# Patient Record
Sex: MALE | Race: OTHER | NOT HISPANIC OR LATINO | URBAN - METROPOLITAN AREA
[De-identification: names, ages, dates, MRNs, and addresses within clinical notes are randomized per-mention and may not be internally consistent; named-entity substitution may affect disease eponyms.]

---

## 2021-01-19 ENCOUNTER — EMERGENCY (EMERGENCY)
Facility: HOSPITAL | Age: 51
LOS: 1 days | Discharge: HOME | End: 2021-01-19
Attending: EMERGENCY MEDICINE | Admitting: EMERGENCY MEDICINE
Payer: COMMERCIAL

## 2021-01-19 VITALS
DIASTOLIC BLOOD PRESSURE: 90 MMHG | WEIGHT: 175.05 LBS | HEIGHT: 69 IN | TEMPERATURE: 99 F | HEART RATE: 98 BPM | SYSTOLIC BLOOD PRESSURE: 152 MMHG | OXYGEN SATURATION: 99 % | RESPIRATION RATE: 18 BRPM

## 2021-01-19 DIAGNOSIS — H57.89 OTHER SPECIFIED DISORDERS OF EYE AND ADNEXA: ICD-10-CM

## 2021-01-19 DIAGNOSIS — H57.13 OCULAR PAIN, BILATERAL: ICD-10-CM

## 2021-01-19 PROCEDURE — 99283 EMERGENCY DEPT VISIT LOW MDM: CPT

## 2021-01-19 NOTE — ED ADULT NURSE NOTE - OBJECTIVE STATEMENT
50 yo male c/o eight eye pain and intermittent blurry vision. Pt. reports that eyes have been itching x 2 months, and he went to [facility] ED where he was prescribed eye drops, and started taking OTC anti-itch eye drops, then two weeks ago he started having eye pain, right worse than left, which became "severe" 3 days ago. Denies change in visual acuity, c/o intermittent blurry vision upon waking up in the AM. Eyes clear on assessment, no drainage or redness noted. Denies photophobia, chest pain, fever/chills.

## 2021-01-19 NOTE — ED PROVIDER NOTE - NSFOLLOWUPINSTRUCTIONS_ED_ALL_ED_FT
Continue using your eye drops.  You can also use  ibuprofen if needed for pain.  Follow up at [facility] in 1-2 days.  Return to the Emergency Department if you have any new or worsening symptoms, or if you have any concerns.  =====================    Eye Pain    WHAT YOU NEED TO KNOW:    Eye pain may be caused by a problem within your eye. A problem or condition in another body area can also cause pain that travels to your eye. Some causes of eye pain include dry eyes, inflammation, a sinus infection, or a cluster headache.    DISCHARGE INSTRUCTIONS:    Medicines: You may need any of the following:  •Artificial tears are eyedrops that can help moisturize your eyes and relieve your pain. Ask your healthcare provider how often to use artificial tears.      •NSAIDs, such as ibuprofen, help decrease swelling, pain, and fever. This medicine is available with or without a doctor's order. NSAIDs can cause stomach bleeding or kidney problems in certain people. If you take blood thinner medicine, always ask if NSAIDs are safe for you. Always read the medicine label and follow directions. Do not give these medicines to children under 6 months of age without direction from your child's healthcare provider.      •Take your medicine as directed. Contact your healthcare provider if you think your medicine is not helping or if you have side effects. Tell him of her if you are allergic to any medicine. Keep a list of the medicines, vitamins, and herbs you take. Include the amounts, and when and why you take them. Bring the list or the pill bottles to follow-up visits. Carry your medicine list with you in case of an emergency.      Follow up with your healthcare provider as directed: You may be referred to an eye specialist. Write down your questions so you remember to ask them during your visits.    Contact your healthcare provider if:   •You have a fever.      •Your eye pain gets worse when you move your eyes.      •You have questions or concerns about your condition or care.      Return to the emergency department if:   •You have any vision loss.      •You have sudden vision changes such as blurred vision, double vision, or seeing halos around lights.      •You develop severe eye pain.

## 2021-01-19 NOTE — ED PROVIDER NOTE - OBJECTIVE STATEMENT
50 y/o M pt with no pertinent PMHx and PSHx presents to ED c/o eye problems. Has itching and burning pain in both eyes, feels like it's deep inside, and has been seeing an eye doctor for several years for this issue. Does not know what the problem is, but symptoms have worsened in the past few weeks. Has been using cysteine eye drops and OTC anti-itch drops which were initially helpful, but did not help at all in the past several days. Does not wear contact lenses, but occasionally wears reading glasses. Denies hx of eye surgery or trauma, crusting of mucous in morning, headache, changes in visual acuity, congestion, rhinorrhea, or any other acute complaints.     PSHx of unknown type abdominal surgery  Takes no other meds aside from eye drops  Former smoker.

## 2021-01-19 NOTE — ED PROVIDER NOTE - CLINICAL SUMMARY MEDICAL DECISION MAKING FREE TEXT BOX
Bilateral eye irritation, described as itching and burning.  Longstanding and worse in past weeks.  Discomfort improved immediately with tetracaine; likely this is an ocular surface problem.  IOP minimally elevated and not likely to be causing his symptoms.  Eye exam otherwise normal.  Discussed with on-call ophthalmologist, agrees. Will recommend continuing ocular lubricant drops and NSAIDs for pain. Patient follows at [facility], can be seen there in 1-2 days.  No emergent need for intervention tonight.

## 2021-01-19 NOTE — ED PROVIDER NOTE - PATIENT PORTAL LINK FT
You can access the FollowMyHealth Patient Portal offered by [facility] by registering at the following website: [email or web address]. By joining [employer]’s FollowMyHealth portal, you will also be able to view your health information using other applications (apps) compatible with our system.

## 2021-01-19 NOTE — ED PROVIDER NOTE - PHYSICAL EXAMINATION
Gen: Well developed, well nourished, NAD   Head: NCAT  Eye: corrected visual acuity 24/40 in HI, eyelids normal, conjunctiva clear, no injection, sclera anicteric, PERRLA without photophobia, EOMI, AC clear  ENT: MMM, OP clear.   Skin: Normal color turgor, no facial or scalp rash  MSK: Neck supple  CV: RRR  Pulm: Normal work of breathing. Gen: Well developed, well nourished, NAD   Head: NCAT  Eye: corrected visual acuity 24/40 in each eye, eyelids normal, conjunctiva clear, no injection, sclera anicteric, PERRLA without photophobia, EOMI, AC clear. Cornea without opacities.  No fluorescein uptake.  IOP: Left 23, right 21.    ENT: MMM, OP clear.   Skin: Normal color turgor, no facial or scalp rash  MSK: Neck supple  CV: RRR  Pulm: Normal work of breathing. Gen: Well developed, well nourished, NAD   Head: NCAT  Eye: corrected visual acuity 24/40 in each eye, eyelids normal, conjunctiva clear, no injection, sclera anicteric, PERRLA without photophobia, EOMI, AC clear. Cornea without opacities.  No fluorescein uptake.  IOP: Left 23, right 21.  Optic discs sharp. Spontaneous venous retinal pulsations seen.  ENT: MMM, OP clear.   Skin: Normal color turgor, no facial or scalp rash  MSK: Neck supple  CV: RRR  Pulm: Normal work of breathing.
